# Patient Record
(demographics unavailable — no encounter records)

---

## 2024-10-22 NOTE — PHYSICAL EXAM
[JVD] : no jugular venous distention  [Normal Breath Sounds] : Normal breath sounds [No Rash or Lesion] : No rash or lesion [Alert] : alert [Calm] : calm [de-identified] : Overweight [de-identified] : Normal [de-identified] : Moderately protuberant abdomen, mild rectus diastasis [de-identified] : Incarcerated periumbilical incisional hernia

## 2024-10-22 NOTE — CONSULT LETTER
[Dear  ___] : Dear  [unfilled], [Courtesy Letter:] : I had the pleasure of seeing your patient, [unfilled], in my office today. [Please see my note below.] : Please see my note below. [Consult Closing:] : Thank you very much for allowing me to participate in the care of this patient.  If you have any questions, please do not hesitate to contact me. [FreeTextEntry3] : Respectfully,  Jonny Keys M.D., FACS [DrManju  ___] : Dr. JOY [DrManju ___] : Dr. JOY

## 2024-10-22 NOTE — ASSESSMENT
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  Physical examination demonstrates a peach sized tender bulge at the umbilical region which is not reducible consistent with an incarcerated periumbilical incisional hernia related to his generous midline incision warranting surgical repair.  There is no evidence of strangulation, and the patient denies any symptoms of obstruction.  This hernia is complicated by a mild to moderate rectus diastasis likely related to his excess abdominal weight and moderately protuberant abdomen.  His current BMI is 33.  I explained the pros and cons of surgery, as well as all risks, benefits, indications and alternatives of the procedure and the patient understood and agreed.  Bryn was scheduled for the repair of his incarcerated periumbilical incisional hernia with mesh on Friday, November 22, 2024 under LOCAL with IV SEDATION at [facility] with presurgical testing preceding this date. He was encouraged to avoid heavy lifting and strenuous activity in the interim, of course.  We also discussed the importance of calorie restriction, healthy eating, and moderate aerobic exercise with regard to weight loss, hernia recurrence and his overall health.  After our discussion, he is aware of the dangers of cigarette smoking, especially with regard to wound healing, wound complications, hernia development and hernia recurrence.  He was encouraged to quit or minimize smoking in the perioperative period and has agreed to try.  A total of 35 minutes was spent on this encounter.

## 2024-12-02 NOTE — PHYSICAL EXAM
[JVD] : no jugular venous distention  [Respiratory Effort] : normal respiratory effort [Alert] : alert [Calm] : calm [de-identified] : overweight [de-identified] : normal [de-identified] :  Moderately protuberant abdomen, mild rectus diastasis.   [de-identified] : Incision clean, dry, intact, no edema, no erythema, no drainage

## 2024-12-02 NOTE — CONSULT LETTER
[Dear  ___] : Dear  [unfilled], [Courtesy Letter:] : I had the pleasure of seeing your patient, [unfilled], in my office today. [Please see my note below.] : Please see my note below. [Consult Closing:] : Thank you very much for allowing me to participate in the care of this patient.  If you have any questions, please do not hesitate to contact me. [Sincerely,] : Sincerely, [FreeTextEntry3] :     Leticia Mtz PA-C, MSPAS [DrManju  ___] : Dr. JOY [DrManju ___] : Dr. JOY

## 2024-12-02 NOTE — ASSESSMENT
[FreeTextEntry1] : LAURO MANN underwent an incarcerated periumbilical incisional hernia repair with mesh and an epigastric incisional hernia repair with mesh with Dr. Keys on 11/22/24  under local IV sedation without any problems or complications. His wound is clean, dry and intact. There is no evidence of erythema, seroma formation or infection. He is tolerating a diet and having normal bowel movements. He denies any significant postoperative pain or discomfort at this time.   He was counseled and reassured. LAURO was discharged from the office with no specific follow up necessary, but he knows to avoid any heavy lifting or strenuous activity for the next several weeks. He  was encouraged to continue to wear his abdominal binder for the better part of the next month.

## 2025-03-31 NOTE — HISTORY OF PRESENT ILLNESS
[FreeTextEntry1] : 63 y/o male with follicular lymphoma, s/p chemotherapy, normal LV function, mild AS, nonobstructive CAD, PVC's. Patient reports no chest pain, now has some dyspnea. ET over 4 METs.  In remission from his lymphoma. S/p  ENT procedure.  No improvement, has chronic post nasal drip.   No syncope, orthopnea or PND. No edema. He has a history of HTN. ECG unremarkable.  Reports occasional BP 140th . Coronary calcifications on CT. Smokes 5 cig per day x 50 years  06/7/24 Chol 249  Trig 161 not on medications

## 2025-03-31 NOTE — HISTORY OF PRESENT ILLNESS
[FreeTextEntry1] : 61 y/o male with follicular lymphoma, s/p chemotherapy, normal LV function, mild AS, nonobstructive CAD, PVC's. Patient reports no chest pain, now has some dyspnea. ET over 4 METs.  In remission from his lymphoma. S/p  ENT procedure.  No improvement, has chronic post nasal drip.   No syncope, orthopnea or PND. No edema. He has a history of HTN. ECG unremarkable.  Reports occasional BP 140th . Coronary calcifications on CT. Smokes 5 cig per day x 50 years  06/7/24 Chol 249  Trig 161 not on medications

## 2025-03-31 NOTE — ASSESSMENT
[FreeTextEntry1] : ECG reviewed today - NSR with PVC's, ST depressions. BP is controlled today. 2D echo in 2023 - normal EF 55-65%, grade I diastolic dysfunction. Mild aortic stenosis. Exam is consistent with mild AS.  CT scan - coronary calcifications. c/w CAD

## 2025-03-31 NOTE — DISCUSSION/SUMMARY
[FreeTextEntry1] : Mild AS - repeat echo BP control - low salt diet recommended, monitor BP, if elevated, add HCTZ Smoking cessation recommended. Coronary calcium c/w CAD. Start statin therapy. Obtain stress echo to evaluate for ischemia and assess AS at the same time.  Patient was advised about healthy lifestyle changes, including diet and exercise. Importance of sustained long-term weight loss was discussed, questions answered.  F/u with hematology.   Return in 6 months

## 2025-03-31 NOTE — PHYSICAL EXAM
[General Appearance - Well Developed] : well developed [Normal Appearance] : normal appearance [Well Groomed] : well groomed [General Appearance - Well Nourished] : well nourished [No Deformities] : no deformities [General Appearance - In No Acute Distress] : no acute distress [Normal Conjunctiva] : the conjunctiva exhibited no abnormalities [] : no respiratory distress [Respiration, Rhythm And Depth] : normal respiratory rhythm and effort [Auscultation Breath Sounds / Voice Sounds] : lungs were clear to auscultation bilaterally [Heart Rate And Rhythm] : heart rate and rhythm were normal [Heart Sounds] : normal S1 and S2 [Edema] : no peripheral edema present [Systolic grade ___/6] : A grade [unfilled]/6 systolic murmur was heard. [Bowel Sounds] : normal bowel sounds [Abdomen Soft] : soft [Abdomen Tenderness] : non-tender [Abnormal Walk] : normal gait [Nail Clubbing] : no clubbing of the fingernails [Cyanosis, Localized] : no localized cyanosis [Petechial Hemorrhages (___cm)] : no petechial hemorrhages [Skin Color & Pigmentation] : normal skin color and pigmentation [No Venous Stasis] : no venous stasis [Oriented To Time, Place, And Person] : oriented to person, place, and time [Affect] : the affect was normal [FreeTextEntry1] : no JVD, + cervical lymphadenopathy

## 2025-03-31 NOTE — LETTER BODY
[To Whom it May Concern:] : To Whom it May Concern: [FreeTextEntry1] : Patient was seen and evaluated in the office today. His symptoms are stable and he has no current cardiac contraindications for the proposed surgical procedure.   Should you have any further questions, please feel free to call me at the office at any time. [FreeTextEntry3] : Prince Menjivar MD

## 2025-04-07 NOTE — PHYSICAL EXAM
[General Appearance - Well Developed] : well developed [Normal Appearance] : normal appearance [Well Groomed] : well groomed [General Appearance - Well Nourished] : well nourished none [No Deformities] : no deformities [General Appearance - In No Acute Distress] : no acute distress [Normal Conjunctiva] : the conjunctiva exhibited no abnormalities [FreeTextEntry1] : no JVD, + cervical lymphadenopathy [] : no respiratory distress [Respiration, Rhythm And Depth] : normal respiratory rhythm and effort [Auscultation Breath Sounds / Voice Sounds] : lungs were clear to auscultation bilaterally [Heart Rate And Rhythm] : heart rate and rhythm were normal [Heart Sounds] : normal S1 and S2 [Edema] : no peripheral edema present [Systolic grade ___/6] : A grade [unfilled]/6 systolic murmur was heard. [Bowel Sounds] : normal bowel sounds [Abdomen Soft] : soft [Abdomen Tenderness] : non-tender [Abnormal Walk] : normal gait [Nail Clubbing] : no clubbing of the fingernails [Cyanosis, Localized] : no localized cyanosis [Petechial Hemorrhages (___cm)] : no petechial hemorrhages [Skin Color & Pigmentation] : normal skin color and pigmentation [No Venous Stasis] : no venous stasis [Oriented To Time, Place, And Person] : oriented to person, place, and time [Affect] : the affect was normal

## 2025-04-07 NOTE — HISTORY OF PRESENT ILLNESS
[FreeTextEntry1] : 61 y/o male with follicular lymphoma, s/p chemotherapy, normal LV function, AS, nonobstructive CAD, PVC's. Patient reports no chest pain, now has some dyspnea. ET over 4 METs.  In remission from his lymphoma. S/p  ENT procedure.  No improvement, has chronic post nasal drip.   No syncope, orthopnea or PND. No edema. He has a history of HTN. ECG unremarkable.  Reports occasional BP 140th . Coronary calcifications on CT. Smokes 5 cig per day x 50 years  06/7/24 Chol 249  Trig 161 not on medications - started statin. 4/4/25 Stress echo - normal wall motion, mod to sev AS.

## 2025-07-21 NOTE — HISTORY OF PRESENT ILLNESS
[FreeTextEntry1] : 63 y/o male with follicular lymphoma, s/p chemotherapy, normal LV function, AS, nonobstructive CAD, PVC's. Patient reports no chest pain, now has some dyspnea. ET over 4 METs.  In remission from his lymphoma. S/p  ENT procedure.  No improvement, has chronic post nasal drip.   No syncope, orthopnea or PND. No edema. He has a history of HTN. ECG unremarkable.  Reports occasional BP 140th . Coronary calcifications on CT. Smokes 5 cig per day x 50 years  06/7/24 Chol 249  Trig 161 not on medications - started statin. 4/4/25 Stress echo - normal wall motion, mod to sev AS.

## 2025-07-21 NOTE — ASSESSMENT
[FreeTextEntry1] : ECG reviewed today - NSR with PVC's, ST depressions. BP is controlled today. 2D echo in 2023 - normal EF 55-65%, grade I diastolic dysfunction. Mild aortic stenosis. Exam is consistent with moderate AS.  CT scan - coronary calcifications. c/w CAD  AS - repeat echo reviewed - AS progressed to at least moderate. Asymptomatic. Disease progression, AS symptoms discussed. If stable - repeat echo next visit, or immediately if any symptoms. BP control - low salt diet recommended, monitor BP, if elevated, add HCTZ Smoking cessation recommended. Coronary calcium c/w CAD. Start statin therapy. Repeat labs next visit Results of stress echo discussed  Patient was advised about healthy lifestyle changes, including diet and exercise. Importance of sustained long-term weight loss was discussed, questions answered.  F/u with hematology.   Return in 6 months

## 2025-07-21 NOTE — PHYSICAL EXAM
[General Appearance - Well Developed] : well developed [Normal Appearance] : normal appearance [Well Groomed] : well groomed [General Appearance - Well Nourished] : well nourished [No Deformities] : no deformities [General Appearance - In No Acute Distress] : no acute distress [Normal Conjunctiva] : the conjunctiva exhibited no abnormalities [FreeTextEntry1] : no JVD, + cervical lymphadenopathy [] : no respiratory distress [Respiration, Rhythm And Depth] : normal respiratory rhythm and effort [Auscultation Breath Sounds / Voice Sounds] : lungs were clear to auscultation bilaterally [Heart Rate And Rhythm] : heart rate and rhythm were normal [Heart Sounds] : normal S1 and S2 [Edema] : no peripheral edema present [Systolic grade ___/6] : A grade [unfilled]/6 systolic murmur was heard. [Bowel Sounds] : normal bowel sounds [Abdomen Soft] : soft [Abdomen Tenderness] : non-tender [Abnormal Walk] : normal gait [Nail Clubbing] : no clubbing of the fingernails [Cyanosis, Localized] : no localized cyanosis [Petechial Hemorrhages (___cm)] : no petechial hemorrhages [Skin Color & Pigmentation] : normal skin color and pigmentation [No Venous Stasis] : no venous stasis [Oriented To Time, Place, And Person] : oriented to person, place, and time [Affect] : the affect was normal